# Patient Record
(demographics unavailable — no encounter records)

---

## 2024-10-15 NOTE — HISTORY OF PRESENT ILLNESS
[LMP unknown] : LMP unknown [FreeTextEntry1] : 37 y/o pt presents in office today for a well women exam  On Tamoxifen  Thyroid inflammation and oncologist felt nodule, had thyroid u/s - will see endocrinologist

## 2024-10-21 NOTE — PHYSICAL EXAM
[Restricted in physically strenuous activity but ambulatory and able to carry out work of a light or sedentary nature] : Status 1- Restricted in physically strenuous activity but ambulatory and able to carry out work of a light or sedentary nature, e.g., light house work, office work [Normal] : affect appropriate [de-identified] : mild goiter, no palpable mass [de-identified] : Tissue expanders in place, no palpable masses in the chest wall or axillae [de-identified] : tearful and anxious

## 2024-10-21 NOTE — HISTORY OF PRESENT ILLNESS
[de-identified] : Patient is a 35 year old woman with a history of Stage IIB [id number] right invasive ductal carcinoma ER 90%, UT 15%, HER-2 negative with metastatic disease to right axillary lymph node s/p neoadjuvant ddAC-T (6/9/23-11/8/23), 12/20/23 b/l mast/R SLNB, adjuvant RT 4240 cGy to right breast (completed 3/11/24), Verzenio 6/8/24-7/2024 (discontinued due to side effects), anastrozole 4/2024-9/25/24 (discontinued due to resumption of menses and body aches), on Lupron since 3/1/24 and tamoxifen since 9/26/24 is here for f/u.  Patient initially palpated the mass a couple weeks ago.  She presented to her primary care physician who ordered breast imaging.  Bilateral diagnostic mammogram and sonogram on 5/2/2023 showed a 1.4 x 1.6 cm mass in the right breast found on sonogram that corresponded to the area of palpable concern.  There was also an atypical right axillary lymph node measuring 1.1 x 1 cm corresponding to an area also seen on mammogram.   Ultrasound-guided core biopsy of both lesions on 5/5/2023 showed invasive ductal carcinoma, ER positive, UT positive, HER2 negative.  Of note, the mammogram did show about 7 to 8 cm of extensive coarse heterogeneous calcifications.. Patient denies skin changes/dimpling, no nipple discharge bilaterally.   Patient reports chronic low back pain more recently associated with some tingling in her left LEs. States that often has to lift heavy things working as a . Denies weight loss. Denies fever, night sweats, headache, blurry vision, shortness of breath, abdominal/pelvic pain. Endorses menorrhagia since 1 year ago, secondary to IUD.   Recently  from her partner. Her 7 year old alternates between the parents. Lives in [place].   5/19/23 MR BREAST Recommend surgical management of highly likely multifocal carcinoma within the right breast with associated axillary metastasis.  If breast sparing or neoadjuvant chemotherapy under consideration, biopsy is recommended of at least 3 regions, including dominant regions located superiorly and medially and of the adjacent two suspicious regions located centrally and more inferiorly to define the extent of disease; targeted ultrasound is recommended to guide biopsy.  Findings and recommendations were relayed directly to Dr. Montanez by Dr. Cota at time of dictation.  5/22/23 PET 1. Multifocal uptake in the upper right breast, SUV max 7.9, consistent with known malignancy. 2. A few prominent hypermetabolic right axillary nodes, consistent with metastatic disease. No other sites concerning for FDG avid metastatic disease are identified. 3. 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  10/30/23 MR BREAST B/L Near complete resolution of previously demonstrated carcinoma within the right breast with interval decrease in known metastatic right axillary lymph nodes.  5/30/24: bone mineral density is within the expected range of normal for a woman of this age.   10/1/24 US thyroid: Diffusely heterogeneous thyroid with ill-defined areas of hypoechogenicity. Findings are suggestive of thyroiditis. Recommend clinical and laboratory correlation. [de-identified] : Pt reported generalized muscle/body aches that is affecting her work and home life.  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  She also reported having her menstrual period.  She missed her July Lupron dose and had a period end of July and her LMP was mid-Sept.  She was switched to tamoxifen at last visit and tolerating well.  She is planning JOHANNE? reconstruction in January.  She previously reported anterior midline neck pain to palpation x several weeks at her last visit.  She was supposed to get a thyroid US done but she never did it.  She denies recent URI, throat/dental infection, thyroid issues, odynophagia, dysphagia.  10/1/24 US thyroid showed small nodule and findings suggestive of thyroiditis.  She was referred to endocrinology for further evaluation, but did not receive call.  GYN annual exam: 10/15/24

## 2024-10-21 NOTE — REVIEW OF SYSTEMS
MARTHA BRODY     correction at this time. A  complete description of the surgery, including where the incisions would be placed and exactly what procedures would be performed was then discussed, and the patient clearly understands what the plan would be. The patient was then advised as to the healing time for this procedure, and understands the restrictions regarding activity immediately post-operatively. When necessary, orthotics have been discussed and biomechanical examination has been performed.  The patient understands that surgery is not an exact science and that complications can and will occur even though we attempt to keep that occurrence to a minimum.  5. Complications and risks, such as, but not limited to: delayed healing, swelling, infection, scarring, recurrence of the deformity and bad results are all problems that can develop.  The patient also understands that there are NO GUARANTEES AS TO THE RESULTS WHATSOEVER. The patient understands they can call the office if they have any questions before or after the surgery.  6. At this time I recommended left fifth toe arthroplasty procedure  7. They will be weightbearing as tolerated on the left limb for 2-3 weeks in a surgical shoe.  8. Will obtain H&P prior to surgical intervention.       Planned Procedure:  Left 5th toe derotational arthroplasty  Anesthesia type:  MAC  CPT: 77250  Length of Case:  30 minutes  Diagnosis as above  Equipment:  Podiatry tray, sagittal saw, mini C-arm  Antibiotic coverage:  2 g Charlie Gallardo DPM     [Diarrhea: Grade 0] : Diarrhea: Grade 0 [Negative] : Allergic/Immunologic

## 2024-10-21 NOTE — HISTORY OF PRESENT ILLNESS
[de-identified] : Patient is a 35 year old woman with a history of Stage IIB [id number] right invasive ductal carcinoma ER 90%, TN 15%, HER-2 negative with metastatic disease to right axillary lymph node s/p neoadjuvant ddAC-T (6/9/23-11/8/23), 12/20/23 b/l mast/R SLNB, adjuvant RT 4240 cGy to right breast (completed 3/11/24), Verzenio 6/8/24-7/2024 (discontinued due to side effects), anastrozole 4/2024-9/25/24 (discontinued due to resumption of menses and body aches), on Lupron since 3/1/24 and tamoxifen since 9/26/24 is here for f/u.  Patient initially palpated the mass a couple weeks ago.  She presented to her primary care physician who ordered breast imaging.  Bilateral diagnostic mammogram and sonogram on 5/2/2023 showed a 1.4 x 1.6 cm mass in the right breast found on sonogram that corresponded to the area of palpable concern.  There was also an atypical right axillary lymph node measuring 1.1 x 1 cm corresponding to an area also seen on mammogram.   Ultrasound-guided core biopsy of both lesions on 5/5/2023 showed invasive ductal carcinoma, ER positive, TN positive, HER2 negative.  Of note, the mammogram did show about 7 to 8 cm of extensive coarse heterogeneous calcifications.. Patient denies skin changes/dimpling, no nipple discharge bilaterally.   Patient reports chronic low back pain more recently associated with some tingling in her left LEs. States that often has to lift heavy things working as a . Denies weight loss. Denies fever, night sweats, headache, blurry vision, shortness of breath, abdominal/pelvic pain. Endorses menorrhagia since 1 year ago, secondary to IUD.   Recently  from her partner. Her 7 year old alternates between the parents. Lives in [place].   5/19/23 MR BREAST Recommend surgical management of highly likely multifocal carcinoma within the right breast with associated axillary metastasis.  If breast sparing or neoadjuvant chemotherapy under consideration, biopsy is recommended of at least 3 regions, including dominant regions located superiorly and medially and of the adjacent two suspicious regions located centrally and more inferiorly to define the extent of disease; targeted ultrasound is recommended to guide biopsy.  Findings and recommendations were relayed directly to Dr. Montanez by Dr. Cota at time of dictation.  5/22/23 PET 1. Multifocal uptake in the upper right breast, SUV max 7.9, consistent with known malignancy. 2. A few prominent hypermetabolic right axillary nodes, consistent with metastatic disease. No other sites concerning for FDG avid metastatic disease are identified. 3. 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  10/30/23 MR BREAST B/L Near complete resolution of previously demonstrated carcinoma within the right breast with interval decrease in known metastatic right axillary lymph nodes.  5/30/24: bone mineral density is within the expected range of normal for a woman of this age.   10/1/24 US thyroid: Diffusely heterogeneous thyroid with ill-defined areas of hypoechogenicity. Findings are suggestive of thyroiditis. Recommend clinical and laboratory correlation. [de-identified] : Pt reported generalized muscle/body aches that is affecting her work and home life.  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  She also reported having her menstrual period.  She missed her July Lupron dose and had a period end of July and her LMP was mid-Sept.  She was switched to tamoxifen at last visit and tolerating well.  She is planning JOHANNE? reconstruction in January.  She previously reported anterior midline neck pain to palpation x several weeks at her last visit.  She was supposed to get a thyroid US done but she never did it.  She denies recent URI, throat/dental infection, thyroid issues, odynophagia, dysphagia.  10/1/24 US thyroid showed small nodule and findings suggestive of thyroiditis.  She was referred to endocrinology for further evaluation, but did not receive call.  GYN annual exam: 10/15/24

## 2024-10-21 NOTE — PHYSICAL EXAM
[Restricted in physically strenuous activity but ambulatory and able to carry out work of a light or sedentary nature] : Status 1- Restricted in physically strenuous activity but ambulatory and able to carry out work of a light or sedentary nature, e.g., light house work, office work [Normal] : affect appropriate [de-identified] : mild goiter, no palpable mass [de-identified] : Tissue expanders in place, no palpable masses in the chest wall or axillae [de-identified] : tearful and anxious

## 2024-10-21 NOTE — HISTORY OF PRESENT ILLNESS
[de-identified] : Patient is a 35 year old woman with a history of Stage IIB [id number] right invasive ductal carcinoma ER 90%, NV 15%, HER-2 negative with metastatic disease to right axillary lymph node s/p neoadjuvant ddAC-T (6/9/23-11/8/23), 12/20/23 b/l mast/R SLNB, adjuvant RT 4240 cGy to right breast (completed 3/11/24), Verzenio 6/8/24-7/2024 (discontinued due to side effects), anastrozole 4/2024-9/25/24 (discontinued due to resumption of menses and body aches), on Lupron since 3/1/24 and tamoxifen since 9/26/24 is here for f/u.  Patient initially palpated the mass a couple weeks ago.  She presented to her primary care physician who ordered breast imaging.  Bilateral diagnostic mammogram and sonogram on 5/2/2023 showed a 1.4 x 1.6 cm mass in the right breast found on sonogram that corresponded to the area of palpable concern.  There was also an atypical right axillary lymph node measuring 1.1 x 1 cm corresponding to an area also seen on mammogram.   Ultrasound-guided core biopsy of both lesions on 5/5/2023 showed invasive ductal carcinoma, ER positive, NV positive, HER2 negative.  Of note, the mammogram did show about 7 to 8 cm of extensive coarse heterogeneous calcifications.. Patient denies skin changes/dimpling, no nipple discharge bilaterally.   Patient reports chronic low back pain more recently associated with some tingling in her left LEs. States that often has to lift heavy things working as a . Denies weight loss. Denies fever, night sweats, headache, blurry vision, shortness of breath, abdominal/pelvic pain. Endorses menorrhagia since 1 year ago, secondary to IUD.   Recently  from her partner. Her 7 year old alternates between the parents. Lives in [place].   5/19/23 MR BREAST Recommend surgical management of highly likely multifocal carcinoma within the right breast with associated axillary metastasis.  If breast sparing or neoadjuvant chemotherapy under consideration, biopsy is recommended of at least 3 regions, including dominant regions located superiorly and medially and of the adjacent two suspicious regions located centrally and more inferiorly to define the extent of disease; targeted ultrasound is recommended to guide biopsy.  Findings and recommendations were relayed directly to Dr. Montanez by Dr. Cota at time of dictation.  5/22/23 PET 1. Multifocal uptake in the upper right breast, SUV max 7.9, consistent with known malignancy. 2. A few prominent hypermetabolic right axillary nodes, consistent with metastatic disease. No other sites concerning for FDG avid metastatic disease are identified. 3. 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  10/30/23 MR BREAST B/L Near complete resolution of previously demonstrated carcinoma within the right breast with interval decrease in known metastatic right axillary lymph nodes.  5/30/24: bone mineral density is within the expected range of normal for a woman of this age.   10/1/24 US thyroid: Diffusely heterogeneous thyroid with ill-defined areas of hypoechogenicity. Findings are suggestive of thyroiditis. Recommend clinical and laboratory correlation. [de-identified] : Pt reported generalized muscle/body aches that is affecting her work and home life.  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  She also reported having her menstrual period.  She missed her July Lupron dose and had a period end of July and her LMP was mid-Sept.  She was switched to tamoxifen at last visit and tolerating well.  She is planning JOHANNE? reconstruction in January.  She previously reported anterior midline neck pain to palpation x several weeks at her last visit.  She was supposed to get a thyroid US done but she never did it.  She denies recent URI, throat/dental infection, thyroid issues, odynophagia, dysphagia.  10/1/24 US thyroid showed small nodule and findings suggestive of thyroiditis.  She was referred to endocrinology for further evaluation, but did not receive call.  GYN annual exam: 10/15/24

## 2024-10-21 NOTE — PAST MEDICAL HISTORY
[Menstruating] : The patient is menstruating [Menarche Age ____] : age at menarche was [unfilled] [Definite ___ (Date)] : the last menstrual period was [unfilled] [Irregular Cycle Intervals] : are  irregular [Total Preg ___] : G[unfilled] [Live Births ___] : P[unfilled]  [Age At Live Birth ___] : Age at live birth: [unfilled] [History of Hormone Replacement Treatment] : has no history of hormone replacement treatment [FreeTextEntry1] : IUD done 1 year ago [FreeTextEntry6] : no [FreeTextEntry7] : yes IUD [FreeTextEntry8] : yes but not long

## 2024-10-21 NOTE — PHYSICAL EXAM
[Restricted in physically strenuous activity but ambulatory and able to carry out work of a light or sedentary nature] : Status 1- Restricted in physically strenuous activity but ambulatory and able to carry out work of a light or sedentary nature, e.g., light house work, office work [Normal] : affect appropriate [de-identified] : mild goiter, no palpable mass [de-identified] : Tissue expanders in place, no palpable masses in the chest wall or axillae [de-identified] : tearful and anxious

## 2024-10-21 NOTE — PHYSICAL EXAM
[Restricted in physically strenuous activity but ambulatory and able to carry out work of a light or sedentary nature] : Status 1- Restricted in physically strenuous activity but ambulatory and able to carry out work of a light or sedentary nature, e.g., light house work, office work [Normal] : affect appropriate [de-identified] : mild goiter, no palpable mass [de-identified] : Tissue expanders in place, no palpable masses in the chest wall or axillae [de-identified] : tearful and anxious

## 2024-10-21 NOTE — HISTORY OF PRESENT ILLNESS
[facility] Progress Note      Patient Name: Halie Guidry  : 1940  MRN: [id number]  Primary Care Physician:  Napoleon Mead MD  Date of admission: 2024    Subjective     Interval History:   Follow-up acute kidney injury on chronic kidney disease    Patient resting comfortably.  Edema decreasing.  No chest pain, shortness of breath, nausea or vomiting    Review of Systems:   As noted above    Objective     Vitals:   Temp:  [97.5 °F (36.4 °C)-98.4 °F (36.9 °C)] 98.2 °F (36.8 °C)  Heart Rate:  [64-68] 65  Resp:  [18] 18  BP: ()/(49-76) 119/54  Flow (L/min):  [2-3] 2    Intake/Output Summary (Last 24 hours) at 2024 0930  Last data filed at 2024 0905  Gross per 24 hour   Intake 510 ml   Output 1100 ml   Net -590 ml       Physical Exam:    General Appearance: alert, awake, chronically ill, no acute distress   Skin: warm and dry  HEENT: oral mucosa normal, nonicteric sclera  Neck: supple, no JVD  Lungs: CTA, unlabored breathing effort  Heart: RRR, normal S1 and S2  Abdomen: soft, nontender, nondistended  : no palpable bladder  Extremities: Trace edema with erythema and venous stasis changes  Neuro: normal speech and mental status     Scheduled Meds:     amLODIPine, 5 mg, Oral, Daily  ammonium lactate, 1 Application, Topical, BID  atorvastatin, 80 mg, Oral, Nightly  bisoprolol, 5 mg, Oral, Daily  castor oil-balsam peru, 1 Application, Topical, Q12H  DULoxetine, 30 mg, Oral, Daily  enoxaparin, 30 mg, Subcutaneous, Nightly  HYDROcodone-acetaminophen, 1 tablet, Oral, TID  hydrocortisone-bacitracin-zinc oxide-nystatin, 1 Application, Topical, BID  insulin glargine, 35 Units, Subcutaneous, Q12H  insulin lispro, 12 Units, Subcutaneous, TID With Meals  insulin lispro, 2-7 Units, Subcutaneous, 4x Daily AC & at Bedtime  levothyroxine, 112 mcg, Oral, Once per day on   levothyroxine, 168 mcg, Oral, Every  Sunday  pantoprazole, 40 mg, Oral, Q AM  pregabalin, 75 mg, Oral, BID  sodium chloride, 10 mL, Intravenous, Q12H  [Held by provider] torsemide, 20 mg, Oral, Daily  cyanocobalamin, 1,000 mcg, Oral, Daily      IV Meds:            Results Reviewed:   I have personally reviewed the results from the time of this admission to 4/27/2024 09:30 EDT     Results from last 7 days   Lab Units 04/27/24  0554 04/26/24  0533 04/25/24  0736 04/24/24  0614 04/23/24  0547   SODIUM mmol/L 136 137  137 136 136 134*   POTASSIUM mmol/L 3.7 3.7  3.7 3.7 3.4* 3.7   CHLORIDE mmol/L 96* 99  99 98 98 97*   CO2 mmol/L 24.2 23.4  23.4 23.7 25.0 23.0   BUN mg/dL 67* 61*  61* 53* 44* 37*   CREATININE mg/dL 2.47* 2.44*  2.44* 2.37* 2.04* 1.80*   CALCIUM mg/dL 8.8 8.8  8.8 9.3 8.3* 8.6   BILIRUBIN mg/dL  --   --  0.4 0.3 0.3   ALK PHOS U/L  --   --  104 90 97   ALT (SGPT) U/L  --   --  9 9 9   AST (SGOT) U/L  --   --  14 10 10   GLUCOSE mg/dL 142* 177*  177* 178* 279* 284*       Estimated Creatinine Clearance: 19.2 mL/min (A) (by C-G formula based on SCr of 2.47 mg/dL (H)).    Results from last 7 days   Lab Units 04/27/24  0554 04/26/24  0533 04/25/24  0736   MAGNESIUM mg/dL 2.0 2.1 2.3   PHOSPHORUS mg/dL 6.6* 5.4* 5.4*       Results from last 7 days   Lab Units 04/27/24  0554 04/26/24  0533 04/25/24  0736 04/24/24  0614 04/23/24  0547 04/22/24  0703   URIC ACID mg/dL 9.8* 9.4* 9.0* 9.5* 8.9* 8.4*       Results from last 7 days   Lab Units 04/27/24  0554 04/26/24  0533 04/25/24  0736 04/24/24  0614 04/23/24  0547   WBC 10*3/mm3 7.66 9.82 8.84 8.40 8.40   HEMOGLOBIN g/dL 11.5* 11.6* 12.6 11.1* 11.6*   PLATELETS 10*3/mm3 294 283 305 289 269             Assessment / Plan     ASSESSMENT:  Acute kidney injury associated with volume depletion and inability to autoregulate since the patient was taking ACE inhibitor renal function improving since admission creatinine slightly increased to 2.47, electrolyte within acceptable range   Chronic kidney  [de-identified] : Patient is a 35 year old woman with a history of Stage IIB [id number] right invasive ductal carcinoma ER 90%, VA 15%, HER-2 negative with metastatic disease to right axillary lymph node s/p neoadjuvant ddAC-T (6/9/23-11/8/23), 12/20/23 b/l mast/R SLNB, adjuvant RT 4240 cGy to right breast (completed 3/11/24), Verzenio 6/8/24-7/2024 (discontinued due to side effects), anastrozole 4/2024-9/25/24 (discontinued due to resumption of menses and body aches), on Lupron since 3/1/24 and tamoxifen since 9/26/24 is here for f/u.  Patient initially palpated the mass a couple weeks ago.  She presented to her primary care physician who ordered breast imaging.  Bilateral diagnostic mammogram and sonogram on 5/2/2023 showed a 1.4 x 1.6 cm mass in the right breast found on sonogram that corresponded to the area of palpable concern.  There was also an atypical right axillary lymph node measuring 1.1 x 1 cm corresponding to an area also seen on mammogram.   Ultrasound-guided core biopsy of both lesions on 5/5/2023 showed invasive ductal carcinoma, ER positive, VA positive, HER2 negative.  Of note, the mammogram did show about 7 to 8 cm of extensive coarse heterogeneous calcifications.. Patient denies skin changes/dimpling, no nipple discharge bilaterally.   Patient reports chronic low back pain more recently associated with some tingling in her left LEs. States that often has to lift heavy things working as a . Denies weight loss. Denies fever, night sweats, headache, blurry vision, shortness of breath, abdominal/pelvic pain. Endorses menorrhagia since 1 year ago, secondary to IUD.   Recently  from her partner. Her 7 year old alternates between the parents. Lives in [place].   5/19/23 MR BREAST Recommend surgical management of highly likely multifocal carcinoma within the right breast with associated axillary metastasis.  If breast sparing or neoadjuvant chemotherapy under consideration, biopsy is recommended of at least 3 regions, including dominant regions located superiorly and medially and of the adjacent two suspicious regions located centrally and more inferiorly to define the extent of disease; targeted ultrasound is recommended to guide biopsy.  Findings and recommendations were relayed directly to Dr. Montanez by Dr. Cota at time of dictation.  5/22/23 PET 1. Multifocal uptake in the upper right breast, SUV max 7.9, consistent with known malignancy. 2. A few prominent hypermetabolic right axillary nodes, consistent with metastatic disease. No other sites concerning for FDG avid metastatic disease are identified. 3. 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  10/30/23 MR BREAST B/L Near complete resolution of previously demonstrated carcinoma within the right breast with interval decrease in known metastatic right axillary lymph nodes.  5/30/24: bone mineral density is within the expected range of normal for a woman of this age.   10/1/24 US thyroid: Diffusely heterogeneous thyroid with ill-defined areas of hypoechogenicity. Findings are suggestive of thyroiditis. Recommend clinical and laboratory correlation. [de-identified] : Pt reported generalized muscle/body aches that is affecting her work and home life.  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  She also reported having her menstrual period.  She missed her July Lupron dose and had a period end of July and her LMP was mid-Sept.  She was switched to tamoxifen at last visit and tolerating well.  She is planning JOHANNE? reconstruction in January.  She previously reported anterior midline neck pain to palpation x several weeks at her last visit.  She was supposed to get a thyroid US done but she never did it.  She denies recent URI, throat/dental infection, thyroid issues, odynophagia, dysphagia.  10/1/24 US thyroid showed small nodule and findings suggestive of thyroiditis.  She was referred to endocrinology for further evaluation, but did not receive call.  GYN annual exam: 10/15/24 disease stage IIIb secondary to diabetic and hypertensive glomerulosclerosis, baseline creatinine is around 1.3  Hypertension associate with chronic kidney disease.  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    I reviewed the chart and other providers notes, reviewed labs.    Copied text in this note has been reviewed and is accurate as of 04/27/24.       Thank you for involving us in the care of Halie Guidry.  Please feel free to call with any questions.    Ishaan Saba MD  04/27/24  09:30 EDT    [facility]  [phone]    Please note that portions of this note were completed with a voice recognition program.

## 2024-10-30 NOTE — REVIEW OF SYSTEMS
[Fatigue] : no fatigue [Decreased Appetite] : appetite not decreased [Recent Weight Gain (___ Lbs)] : no recent weight gain [Visual Field Defect] : no visual field defect [Dry Eyes] : no dryness [Dysphagia] : no dysphagia [Neck Pain] : no neck pain [Dysphonia] : no dysphonia [Chest Pain] : no chest pain [Slow Heart Rate] : heart rate is not slow [Palpitations] : no palpitations [Shortness Of Breath] : no shortness of breath [Cough] : no cough [Nausea] : no nausea [Constipation] : no constipation [Vomiting] : no vomiting [Polyuria] : no polyuria [Dysuria] : no dysuria [Irregular Menses] : regular menses [Joint Pain] : no joint pain [Muscle Weakness] : no muscle weakness [Acanthosis] : no acanthosis  [Acne] : no acne [Headaches] : no headaches [Dizziness] : no dizziness [Tremors] : no tremors [Depression] : no depression [Polydipsia] : no polydipsia [Cold Intolerance] : no cold intolerance [Easy Bleeding] : no ~M tendency for easy bleeding [Easy Bruising] : no tendency for easy bruising

## 2024-10-30 NOTE — ASSESSMENT
[FreeTextEntry1] : 36 year old female with history of Stage IIB [id number] right invasive ductal carcinoma with metastases to the right axillary nodes currently on Lupron and Tamoxifen here for evaluation of anterior neck pain.  Patient started a month prior and now is significantly less compared to before. She denied any overt hyperthyroid symptoms.   Thyroiditis:  -Check TFTs, ESR, CRP, TSI -Advised to take NSAIDs as needed -Will be called back with results -US showing resolution of hypoechoic areas that occur during thyroiditis   Thyroid Nodule:  -Right sided 6 mm nodule  -Follow up US in 18 months   -Follow up as per results

## 2024-10-30 NOTE — PHYSICAL EXAM
[Alert] : alert [Well Nourished] : well nourished [No Acute Distress] : no acute distress [Normal Sclera/Conjunctiva] : normal sclera/conjunctiva [PERRL] : pupils equal, round and reactive to light [Normal Outer Ear/Nose] : the ears and nose were normal in appearance [Normal TMs] : both tympanic membranes were normal [No Respiratory Distress] : no respiratory distress [Clear to Auscultation] : lungs were clear to auscultation bilaterally [Normal S1, S2] : normal S1 and S2 [Normal Rate] : heart rate was normal [Normal Bowel Sounds] : normal bowel sounds [Soft] : abdomen soft [Normal Gait] : normal gait [No Clubbing, Cyanosis] : no clubbing  or cyanosis of the fingernails [No Joint Swelling] : no joint swelling seen [No Skin Lesions] : no skin lesions [Oriented x3] : oriented to person, place, and time [Normal Affect] : the affect was normal [Normal Insight/Judgement] : insight and judgment were intact [Thyroid Not Enlarged] : the thyroid was not enlarged [de-identified] : +denied any anterior thyroidal pain on palpation

## 2024-10-30 NOTE — HISTORY OF PRESENT ILLNESS
[FreeTextEntry1] : 36 year old woman with a history of Stage IIB [id number] right invasive ductal carcinoma ER 90%, NE 15%, HER-2 negative with metastatic disease to right axillary lymph node s/p neoadjuvant ddAC-T (6/9/23-11/8/23), 12/20/23 b/l mast/R SLNB, adjuvant RT 4240 cGy to right breast (completed 3/11/24), Verzenio 6/8/24-7/2024 (discontinued due to side effects), anastrozole 4/2024-9/25/24 (discontinued due to resumption of menses and body aches), on Lupron since 3/1/24 and tamoxifen since 9/26/24 is here for  evaluation of possible thyroiditis.   She previously reported anterior midline neck pain to palpation x several weeks in the month of September and october 2024. She reported that it has improved this then.  She denied any heart palpitations, tremors, hair loss, weight changes or changes in her bowel movements.    10/1/24 US thyroid: Diffusely heterogeneous thyroid with ill-defined areas of hypo-echogenicity. 6 mm right sided thyroid nodule   Findings are suggestive of thyroiditis. Recommend clinical and laboratory correlation.

## 2025-04-17 NOTE — HISTORY OF PRESENT ILLNESS
[FreeTextEntry1] : Helen Melton is a 35 yo F with RIGHT breast AJCC 8th Ed Stage IIB (cT2N1; ypTisN0(sn)) ER 90%/PR15%/HER-2 negative IDC s/p AC, Taxol, now s/p b/l mastectomies and right SLNB with TE reconstruction 12/20/23 (no residual invasive disease, high-grade DCIS+, 0/1 LN) referred to radiation oncology by Dr. Montanez for discussion of adjuvant radiation to the Right breast. She completed 4240cGy to the Right breast on 3/11/24.  JOHANNE flap reconstruction with Dr. Wylie 2/2025.   4/29/25: RPA She continues on Lupron and tamoxifen under the care of Dr. Mathew. Seen by Dr. Montanez on 4/5/25.   4/23/24: PTE Ms. Melton presents for post treatment evaluation. She reports she has regained her energy, and she has been participating in [employer] runs. Appetite is good. Skin is healing and hyperpigmentation is resolving.   3/5/24:  Final OTV  3180cGy/4240cGy, 12/16fx to Right breast. Ms. King reports feeling well. She has mild fatigue, unchanged from previous. Appetite is good. She has mild redness on breast. She is using mometasone as instructed. Discharge packet reviewed with patient. Plan to complete radiation and follow up in April.   2/28/24: OTV 2120cGy/4240cGy, 8/16fx to Right breast. She reports she is feeling well overall, She is currently recovering from a cold. Appetite is good. Skin is good, mild redness. She is using mometasone and OTC as directed. ROM is good. Plan to continue radiation.   2/21/24: OTV 1060cGy/4240cGy, 4/16fx to Right breast. She reports feeling well overall. Has some lightheadedness, and states she is not adequately hydrating. Advised to hydrate. Appetite is good. Skin is good, she is not yet using OTC. Script for mometasone sent to pharmacy and patient instructed in use. Plan to continue radiation.   1/16/24: SNA Patient presents postoperatively for re evaluation for adjuvant radiation. She reports her energy level is improving, and her appetite is good. Surgical incisions are healing, she continues to feel some pinching. Numbness remains Right axilla. She denies pain.   11/10/23: Consultation Patient is here for discussion of adjuvant radiation. She reports she is mildly fatigued as she had her last dose of Taxol on Wednesday. Otherwise she is generally feeling well. She denies pain. She is anxious regarding the next steps of treatment.   History of Present Illness   ________________________________   Helen Melton is a 35 year old woman with new diagnosis of right invasive ductal carcinoma ER 90%, MI 15%, HER-2 negative with metastatic disease to right axillary lymph node. She initially palpated the mass in April/May. She presented to her primary care physician who ordered breast imaging.    5/2/23: bilateral diagnostic mammogram and sonogram. 1.4 x 1.6 cm mass in the right breast found on sonogram that corresponded to the area of palpable concern. There was also an atypical right axillary lymph node measuring 1.1 x 1 cm corresponding to an area also seen on mammogram. Ultrasound-guided biopsy of both lesions was recommended.     5/5/23: Ultrasound-guided core biopsy of both lesions showed invasive ductal carcinoma, ER positive, MI positive, HER2 negative.   Of note, the mammogram did show about 7 to 8 cm of extensive coarse heterogeneous calcifications.. Patient denies skin changes/dimpling, no nipple discharge bilaterally.    5/19/23 ([facility]) MRI: Recommend surgical management of highly likely multifocal carcinoma within the right breast with associated axillary metastasis. If breast sparing or neoadjuvant chemotherapy under consideration, biopsy is recommended of at least 3 regions, including dominant regions located superiorly and medially and of the adjacent two suspicious regions located centrally and more inferiorly to define the extent of disease; targeted ultrasound is recommended to guide biopsy. ADDENDUM # 1 ***   There are multiple enlarged lymph nodes in the right axilla, including in the high retropectoral region, corresponding to known axillary metastasis.    5/22/23 ([facility]) PET/CT: 1. Multifocal uptake in the upper right breast, SUV max 7.9, consistent with known malignancy.   2. A few prominent hypermetabolic right axillary nodes, consistent with metastatic disease. No other sites concerning for FDG avid metastatic disease are identified.   3. 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    6/9/23-11/8/23: Adjuvant chemotherapy  AC w/ Neulasta (6/9/23-7/21/23)  Taxol (8/4/23- 11/8/23)   10/30/23 ([facility]) MRI: Near complete resolution of previously demonstrated carcinoma within the right breast with interval decrease in known metastatic right axillary lymph nodes. RECOMMENDATION: Surgical or oncologic management. BI-RADS 6.   12/20/23: surgery/pathology  Bilateral mastectomy, right sentinel lymph node biopsy, injection of Magtrace, and localization of right Magseed lymph node  Final Diagnosis  1. Breast, left; mastectomy:  - Benign breast parenchyma  - Benign nipple and skin  2. 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 Right axillary sentinel lymph node:  - One benign lymph node (0/1) with treatment effect and biopsy site changes (See note)  Note: Immunohistochemical stain for cytokeratin AE1/AE3 supports the diagnosis. Part 3 was reviewed in consultation by Dr. Sheri Dillard who concurs.  4. Breast, right, additional anterior; excision:  - Benign breast parenchyma (See note)   Note: Multiple levels examined Part 4 was reviewed in consultation by Dr. Sheri Dillard who concurs.  5. Breast, right, additional lateral; excision: Benign fibroadipose tissue  6. Right axillary tail: Benign fibroadipose tissue  7. Left axillary tail: Benign fibroadipose tissue   12/20/23:  TE reconstruction with Dr Wylie

## 2025-04-17 NOTE — DISEASE MANAGEMENT
[Clinical] : TNM Stage: c [IIB] : IIB [TTNM] : 2 [NTNM] : 1 [MTNM] : X [de-identified] : [id number] [de-identified] : [id number] [de-identified] : Right breast

## 2025-04-28 NOTE — ASSESSMENT
[FreeTextEntry1] : Today we discussed the **POSITIVE trial (Pregnancy Outcome and Safety of Interrupting Therapy for women with endocrine responsive breast cancer)**, also known as IBCSG 48-14 / BIG 8-13.  Here's a summary of what it investigated and found:  1.  **Purpose:** The trial was designed to assess the safety of temporarily interrupting adjuvant endocrine therapy (like Tamoxifen or Aromatase Inhibitors +/- ovarian suppression) for up to 2 years in young women with early-stage, hormone receptor-positive (HR+) breast cancer who wished to attempt pregnancy. 2.  **Participants:** It enrolled premenopausal women (aged 42 or younger) diagnosed with Stage I, II, or III HR+ breast cancer who had completed between 18 and 30 months of adjuvant endocrine therapy and wanted to pause treatment to conceive. 3.  **Key Question:** Does interrupting endocrine therapy to attempt pregnancy increase the risk of breast cancer recurrence compared to continuing therapy without interruption? 4.  **Primary Findings (presented initially at [facility] 2022 and published in the NEJM in 2023):**     *   **Safety (Short-term Recurrence):** At a median follow-up of around 41 months, interrupting endocrine therapy for pregnancy was found to be **safe in the short term**. The rate of breast cancer recurrence among women in the trial was **not significantly higher** than that observed in external control cohorts (like the SOFT/TEXT trials) of similar patients who continued endocrine therapy without interruption. The 3-year invasive breast cancer-free survival rate was very similar.     *   **Pregnancy Outcomes:** A high percentage of women who stopped therapy and attempted pregnancy were able to conceive, and the rates of live births were comparable to those in the general population of similar age. The majority of babies born were healthy, with congenital anomaly rates consistent with the general population.     *   **Resumption of Therapy:** A large majority of women resumed their endocrine therapy after the pregnancy attempt window (successful or not).  **In essence, the POSITIVE trial provides reassuring short-term data suggesting that a temporary break (up to 2 years) in endocrine therapy for conception and pregnancy does not appear to significantly increase the risk of early recurrence for young women with HR+ early-stage breast cancer.**  It's important to note:  *   Longer-term follow-up is still ongoing and crucial to confirm these findings over time. *   The decision to interrupt therapy is highly personal and should be made after a thorough discussion between the patient and the oncology team, weighing the individual risks and benefits based on their specific cancer characteristics (stage, grade, janell status, etc.) and personal desire for pregnancy.

## 2025-04-28 NOTE — REASON FOR VISIT
Telephone Encounter by Ruben Davis RN, BSN at 08/13/18 08:35 AM     Author:  Ruben Davis RN, BSN Service:  (none) Author Type:  Registered Nurse     Filed:  08/13/18 08:36 AM Encounter Date:  8/11/2018 Status:  Signed     :  Ruben Davis RN, BSN (Registered Nurse)            Refilled per medication protocol. (last visit[NA1.1T] 2/27/18[NA1.1M], next visit[NA1.1T] 8/28/18[NA1.1M], last labs[NA1.1T] 3/13/18[NA1.1M])[NA1.1T]      Revision History        User Key Date/Time User Provider Type Action    > NA1.1 08/13/18 08:36 AM Ruben Davis RN, BSN Registered Nurse Sign    M - Manual, T - Template [Follow-Up Visit] : a follow-up [FreeTextEntry2] : breast cancer

## 2025-04-28 NOTE — PHYSICAL EXAM
[Restricted in physically strenuous activity but ambulatory and able to carry out work of a light or sedentary nature] : Status 1- Restricted in physically strenuous activity but ambulatory and able to carry out work of a light or sedentary nature, e.g., light house work, office work [Normal] : affect appropriate [de-identified] : mild goiter, no palpable mass [de-identified] : Tissue expanders in place, no palpable masses in the chest wall or axillae [de-identified] : tearful and anxious

## 2025-04-28 NOTE — HISTORY OF PRESENT ILLNESS
[de-identified] : Patient is a 36 year old woman with a history of Stage IIB [id number] right invasive ductal carcinoma ER 90%, CA 15%, HER-2 negative with metastatic disease to right axillary lymph node s/p neoadjuvant ddAC-T (6/9/23-11/8/23), 12/20/23 b/l mast/R SLNB, adjuvant RT 4240 cGy to right breast (completed 3/11/24), Verzenio 6/8/24-7/2024 (discontinued due to side effects), anastrozole 4/2024-9/25/24 (discontinued due to resumption of menses and body aches), on Lupron since 3/1/24 and tamoxifen since 9/26/24 is here for f/u.  She had a JOHANNE reconstruction approximately 2/2025.  Patient initially palpated the mass a couple weeks ago.  She presented to her primary care physician who ordered breast imaging.  Bilateral diagnostic mammogram and sonogram on 5/2/2023 showed a 1.4 x 1.6 cm mass in the right breast found on sonogram that corresponded to the area of palpable concern.  There was also an atypical right axillary lymph node measuring 1.1 x 1 cm corresponding to an area also seen on mammogram.   Ultrasound-guided core biopsy of both lesions on 5/5/2023 showed invasive ductal carcinoma, ER positive, CA positive, HER2 negative.  Of note, the mammogram did show about 7 to 8 cm of extensive coarse heterogeneous calcifications.. Patient denies skin changes/dimpling, no nipple discharge bilaterally.   Patient reports chronic low back pain more recently associated with some tingling in her left LEs. States that often has to lift heavy things working as a . Denies weight loss. Denies fever, night sweats, headache, blurry vision, shortness of breath, abdominal/pelvic pain. Endorses menorrhagia since 1 year ago, secondary to IUD.   Recently  from her partner. Her 7 year old alternates between the parents. Lives in [place].   5/19/23 MR BREAST Recommend surgical management of highly likely multifocal carcinoma within the right breast with associated axillary metastasis.  If breast sparing or neoadjuvant chemotherapy under consideration, biopsy is recommended of at least 3 regions, including dominant regions located superiorly and medially and of the adjacent two suspicious regions located centrally and more inferiorly to define the extent of disease; targeted ultrasound is recommended to guide biopsy.  Findings and recommendations were relayed directly to Dr. Montanez by Dr. Cota at time of dictation.  5/22/23 PET 1. Multifocal uptake in the upper right breast, SUV max 7.9, consistent with known malignancy. 2. A few prominent hypermetabolic right axillary nodes, consistent with metastatic disease. No other sites concerning for FDG avid metastatic disease are identified. 3. 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  10/30/23 MR BREAST B/L Near complete resolution of previously demonstrated carcinoma within the right breast with interval decrease in known metastatic right axillary lymph nodes.  5/30/24: bone mineral density is within the expected range of normal for a woman of this age.   10/1/24 US thyroid: Diffusely heterogeneous thyroid with ill-defined areas of hypoechogenicity. Findings are suggestive of thyroiditis. Recommend clinical and laboratory correlation. [de-identified] : She is tolerating well.  She is inquiring about possibility of having a baby.

## 2025-04-28 NOTE — PHYSICAL EXAM
[Restricted in physically strenuous activity but ambulatory and able to carry out work of a light or sedentary nature] : Status 1- Restricted in physically strenuous activity but ambulatory and able to carry out work of a light or sedentary nature, e.g., light house work, office work [Normal] : affect appropriate [de-identified] : mild goiter, no palpable mass [de-identified] : Tissue expanders in place, no palpable masses in the chest wall or axillae [de-identified] : tearful and anxious

## 2025-04-28 NOTE — HISTORY OF PRESENT ILLNESS
[de-identified] : Patient is a 36 year old woman with a history of Stage IIB [id number] right invasive ductal carcinoma ER 90%, KS 15%, HER-2 negative with metastatic disease to right axillary lymph node s/p neoadjuvant ddAC-T (6/9/23-11/8/23), 12/20/23 b/l mast/R SLNB, adjuvant RT 4240 cGy to right breast (completed 3/11/24), Verzenio 6/8/24-7/2024 (discontinued due to side effects), anastrozole 4/2024-9/25/24 (discontinued due to resumption of menses and body aches), on Lupron since 3/1/24 and tamoxifen since 9/26/24 is here for f/u.  She had a JOHANNE reconstruction approximately 2/2025.  Patient initially palpated the mass a couple weeks ago.  She presented to her primary care physician who ordered breast imaging.  Bilateral diagnostic mammogram and sonogram on 5/2/2023 showed a 1.4 x 1.6 cm mass in the right breast found on sonogram that corresponded to the area of palpable concern.  There was also an atypical right axillary lymph node measuring 1.1 x 1 cm corresponding to an area also seen on mammogram.   Ultrasound-guided core biopsy of both lesions on 5/5/2023 showed invasive ductal carcinoma, ER positive, KS positive, HER2 negative.  Of note, the mammogram did show about 7 to 8 cm of extensive coarse heterogeneous calcifications.. Patient denies skin changes/dimpling, no nipple discharge bilaterally.   Patient reports chronic low back pain more recently associated with some tingling in her left LEs. States that often has to lift heavy things working as a . Denies weight loss. Denies fever, night sweats, headache, blurry vision, shortness of breath, abdominal/pelvic pain. Endorses menorrhagia since 1 year ago, secondary to IUD.   Recently  from her partner. Her 7 year old alternates between the parents. Lives in [place].   5/19/23 MR BREAST Recommend surgical management of highly likely multifocal carcinoma within the right breast with associated axillary metastasis.  If breast sparing or neoadjuvant chemotherapy under consideration, biopsy is recommended of at least 3 regions, including dominant regions located superiorly and medially and of the adjacent two suspicious regions located centrally and more inferiorly to define the extent of disease; targeted ultrasound is recommended to guide biopsy.  Findings and recommendations were relayed directly to Dr. Montanez by Dr. Cota at time of dictation.  5/22/23 PET 1. Multifocal uptake in the upper right breast, SUV max 7.9, consistent with known malignancy. 2. A few prominent hypermetabolic right axillary nodes, consistent with metastatic disease. No other sites concerning for FDG avid metastatic disease are identified. 3. 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  10/30/23 MR BREAST B/L Near complete resolution of previously demonstrated carcinoma within the right breast with interval decrease in known metastatic right axillary lymph nodes.  5/30/24: bone mineral density is within the expected range of normal for a woman of this age.   10/1/24 US thyroid: Diffusely heterogeneous thyroid with ill-defined areas of hypoechogenicity. Findings are suggestive of thyroiditis. Recommend clinical and laboratory correlation. [de-identified] : She is tolerating well.  She is inquiring about possibility of having a baby.

## 2025-04-28 NOTE — HISTORY OF PRESENT ILLNESS
[de-identified] : Patient is a 36 year old woman with a history of Stage IIB [id number] right invasive ductal carcinoma ER 90%, MD 15%, HER-2 negative with metastatic disease to right axillary lymph node s/p neoadjuvant ddAC-T (6/9/23-11/8/23), 12/20/23 b/l mast/R SLNB, adjuvant RT 4240 cGy to right breast (completed 3/11/24), Verzenio 6/8/24-7/2024 (discontinued due to side effects), anastrozole 4/2024-9/25/24 (discontinued due to resumption of menses and body aches), on Lupron since 3/1/24 and tamoxifen since 9/26/24 is here for f/u.  She had a JOHANNE reconstruction approximately 2/2025.  Patient initially palpated the mass a couple weeks ago.  She presented to her primary care physician who ordered breast imaging.  Bilateral diagnostic mammogram and sonogram on 5/2/2023 showed a 1.4 x 1.6 cm mass in the right breast found on sonogram that corresponded to the area of palpable concern.  There was also an atypical right axillary lymph node measuring 1.1 x 1 cm corresponding to an area also seen on mammogram.   Ultrasound-guided core biopsy of both lesions on 5/5/2023 showed invasive ductal carcinoma, ER positive, MD positive, HER2 negative.  Of note, the mammogram did show about 7 to 8 cm of extensive coarse heterogeneous calcifications.. Patient denies skin changes/dimpling, no nipple discharge bilaterally.   Patient reports chronic low back pain more recently associated with some tingling in her left LEs. States that often has to lift heavy things working as a . Denies weight loss. Denies fever, night sweats, headache, blurry vision, shortness of breath, abdominal/pelvic pain. Endorses menorrhagia since 1 year ago, secondary to IUD.   Recently  from her partner. Her 7 year old alternates between the parents. Lives in [place].   5/19/23 MR BREAST Recommend surgical management of highly likely multifocal carcinoma within the right breast with associated axillary metastasis.  If breast sparing or neoadjuvant chemotherapy under consideration, biopsy is recommended of at least 3 regions, including dominant regions located superiorly and medially and of the adjacent two suspicious regions located centrally and more inferiorly to define the extent of disease; targeted ultrasound is recommended to guide biopsy.  Findings and recommendations were relayed directly to Dr. Montanez by Dr. Cota at time of dictation.  5/22/23 PET 1. Multifocal uptake in the upper right breast, SUV max 7.9, consistent with known malignancy. 2. A few prominent hypermetabolic right axillary nodes, consistent with metastatic disease. No other sites concerning for FDG avid metastatic disease are identified. 3. 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  10/30/23 MR BREAST B/L Near complete resolution of previously demonstrated carcinoma within the right breast with interval decrease in known metastatic right axillary lymph nodes.  5/30/24: bone mineral density is within the expected range of normal for a woman of this age.   10/1/24 US thyroid: Diffusely heterogeneous thyroid with ill-defined areas of hypoechogenicity. Findings are suggestive of thyroiditis. Recommend clinical and laboratory correlation. [de-identified] : She is tolerating well.  She is inquiring about possibility of having a baby.

## 2025-04-28 NOTE — PHYSICAL EXAM
[Restricted in physically strenuous activity but ambulatory and able to carry out work of a light or sedentary nature] : Status 1- Restricted in physically strenuous activity but ambulatory and able to carry out work of a light or sedentary nature, e.g., light house work, office work [Normal] : affect appropriate [de-identified] : mild goiter, no palpable mass [de-identified] : Tissue expanders in place, no palpable masses in the chest wall or axillae [de-identified] : tearful and anxious

## 2025-05-02 NOTE — REVIEW OF SYSTEMS
[Negative] : Allergic/Immunologic [Edema Limbs: Grade 0] : Edema Limbs: Grade 0  [Fatigue: Grade 0] : Fatigue: Grade 0 [Localized Edema: Grade 0] : Localized Edema: Grade 0

## 2025-05-02 NOTE — HISTORY OF PRESENT ILLNESS
[FreeTextEntry1] : Helen Melton is a 37 yo F with RIGHT breast AJCC 8th Ed Stage IIB (cT2N1; ypTisN0(sn)) ER 90%/PR15%/HER-2 negative IDC s/p AC, Taxol, now s/p b/l mastectomies and right SLNB with TE reconstruction 12/20/23 (no residual invasive disease, high-grade DCIS+, 0/1 LN) referred to radiation oncology by Dr. Montanez for discussion of adjuvant radiation to the Right breast. She completed 4240cGy to the Right breast on 3/11/24.  JOHANNE flap reconstruction with Dr. Wylie 2/2025.   4/29/25: RPA Ms. Melton reports feeling well. Appetite is good. She denies fatigue. She has returned to work following JOHANNE flap reconstruction ROM is good. She continues on Lupron and tamoxifen under the care of Dr. Mathew. She was seen by Dr. Montanez on 4/5/25.   4/23/24: PTE Ms. Melton presents for post treatment evaluation. She reports she has regained her energy, and she has been participating in [employer] runs. Appetite is good. Skin is healing and hyperpigmentation is resolving.   3/5/24:  Final OTV  3180cGy/4240cGy, 12/16fx to Right breast. Ms. King reports feeling well. She has mild fatigue, unchanged from previous. Appetite is good. She has mild redness on breast. She is using mometasone as instructed. Discharge packet reviewed with patient. Plan to complete radiation and follow up in April.   2/28/24: OTV 2120cGy/4240cGy, 8/16fx to Right breast. She reports she is feeling well overall, She is currently recovering from a cold. Appetite is good. Skin is good, mild redness. She is using mometasone and OTC as directed. ROM is good. Plan to continue radiation.   2/21/24: OTV 1060cGy/4240cGy, 4/16fx to Right breast. She reports feeling well overall. Has some lightheadedness, and states she is not adequately hydrating. Advised to hydrate. Appetite is good. Skin is good, she is not yet using OTC. Script for mometasone sent to pharmacy and patient instructed in use. Plan to continue radiation.   1/16/24: SNA Patient presents postoperatively for re evaluation for adjuvant radiation. She reports her energy level is improving, and her appetite is good. Surgical incisions are healing, she continues to feel some pinching. Numbness remains Right axilla. She denies pain.   11/10/23: Consultation Patient is here for discussion of adjuvant radiation. She reports she is mildly fatigued as she had her last dose of Taxol on Wednesday. Otherwise she is generally feeling well. She denies pain. She is anxious regarding the next steps of treatment.   History of Present Illness   ________________________________   Helen Melton is a 35 year old woman with new diagnosis of right invasive ductal carcinoma ER 90%, UT 15%, HER-2 negative with metastatic disease to right axillary lymph node. She initially palpated the mass in April/May. She presented to her primary care physician who ordered breast imaging.    5/2/23: bilateral diagnostic mammogram and sonogram. 1.4 x 1.6 cm mass in the right breast found on sonogram that corresponded to the area of palpable concern. There was also an atypical right axillary lymph node measuring 1.1 x 1 cm corresponding to an area also seen on mammogram. Ultrasound-guided biopsy of both lesions was recommended.     5/5/23: Ultrasound-guided core biopsy of both lesions showed invasive ductal carcinoma, ER positive, UT positive, HER2 negative.   Of note, the mammogram did show about 7 to 8 cm of extensive coarse heterogeneous calcifications.. Patient denies skin changes/dimpling, no nipple discharge bilaterally.    5/19/23 ([facility]) MRI: Recommend surgical management of highly likely multifocal carcinoma within the right breast with associated axillary metastasis. If breast sparing or neoadjuvant chemotherapy under consideration, biopsy is recommended of at least 3 regions, including dominant regions located superiorly and medially and of the adjacent two suspicious regions located centrally and more inferiorly to define the extent of disease; targeted ultrasound is recommended to guide biopsy. ADDENDUM # 1 ***   There are multiple enlarged lymph nodes in the right axilla, including in the high retropectoral region, corresponding to known axillary metastasis.    5/22/23 ([facility]) PET/CT: 1. Multifocal uptake in the upper right breast, SUV max 7.9, consistent with known malignancy.   2. A few prominent hypermetabolic right axillary nodes, consistent with metastatic disease. No other sites concerning for FDG avid metastatic disease are identified.   3. 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    6/9/23-11/8/23: Adjuvant chemotherapy  AC w/ Neulasta (6/9/23-7/21/23)  Taxol (8/4/23- 11/8/23)   10/30/23 ([facility]) MRI: Near complete resolution of previously demonstrated carcinoma within the right breast with interval decrease in known metastatic right axillary lymph nodes. RECOMMENDATION: Surgical or oncologic management. BI-RADS 6.   12/20/23: surgery/pathology  Bilateral mastectomy, right sentinel lymph node biopsy, injection of Magtrace, and localization of right Magseed lymph node  Final Diagnosis  1. Breast, left; mastectomy:  - Benign breast parenchyma  - Benign nipple and skin  2. 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 Right axillary sentinel lymph node:  - One benign lymph node (0/1) with treatment effect and biopsy site changes (See note)  Note: Immunohistochemical stain for cytokeratin AE1/AE3 supports the diagnosis. Part 3 was reviewed in consultation by Dr. Sheri Dillard who concurs.  4. Breast, right, additional anterior; excision:  - Benign breast parenchyma (See note)   Note: Multiple levels examined Part 4 was reviewed in consultation by Dr. Sheri Dillard who concurs.  5. Breast, right, additional lateral; excision: Benign fibroadipose tissue  6. Right axillary tail: Benign fibroadipose tissue  7. Left axillary tail: Benign fibroadipose tissue   12/20/23:  TE reconstruction with Dr Wylie

## 2025-05-02 NOTE — HISTORY OF PRESENT ILLNESS
[FreeTextEntry1] : Helen Melton is a 37 yo F with RIGHT breast AJCC 8th Ed Stage IIB (cT2N1; ypTisN0(sn)) ER 90%/PR15%/HER-2 negative IDC s/p AC, Taxol, now s/p b/l mastectomies and right SLNB with TE reconstruction 12/20/23 (no residual invasive disease, high-grade DCIS+, 0/1 LN) referred to radiation oncology by Dr. Montanez for discussion of adjuvant radiation to the Right breast. She completed 4240cGy to the Right breast on 3/11/24.  JOHANNE flap reconstruction with Dr. Wylie 2/2025.   4/29/25: RPA Ms. Melton reports feeling well. Appetite is good. She denies fatigue. She has returned to work following JOHANNE flap reconstruction ROM is good. She continues on Lupron and tamoxifen under the care of Dr. Mathew. She was seen by Dr. Montanez on 4/5/25.   4/23/24: PTE Ms. Melton presents for post treatment evaluation. She reports she has regained her energy, and she has been participating in [employer] runs. Appetite is good. Skin is healing and hyperpigmentation is resolving.   3/5/24:  Final OTV  3180cGy/4240cGy, 12/16fx to Right breast. Ms. King reports feeling well. She has mild fatigue, unchanged from previous. Appetite is good. She has mild redness on breast. She is using mometasone as instructed. Discharge packet reviewed with patient. Plan to complete radiation and follow up in April.   2/28/24: OTV 2120cGy/4240cGy, 8/16fx to Right breast. She reports she is feeling well overall, She is currently recovering from a cold. Appetite is good. Skin is good, mild redness. She is using mometasone and OTC as directed. ROM is good. Plan to continue radiation.   2/21/24: OTV 1060cGy/4240cGy, 4/16fx to Right breast. She reports feeling well overall. Has some lightheadedness, and states she is not adequately hydrating. Advised to hydrate. Appetite is good. Skin is good, she is not yet using OTC. Script for mometasone sent to pharmacy and patient instructed in use. Plan to continue radiation.   1/16/24: SNA Patient presents postoperatively for re evaluation for adjuvant radiation. She reports her energy level is improving, and her appetite is good. Surgical incisions are healing, she continues to feel some pinching. Numbness remains Right axilla. She denies pain.   11/10/23: Consultation Patient is here for discussion of adjuvant radiation. She reports she is mildly fatigued as she had her last dose of Taxol on Wednesday. Otherwise she is generally feeling well. She denies pain. She is anxious regarding the next steps of treatment.   History of Present Illness   ________________________________   Helen Melton is a 35 year old woman with new diagnosis of right invasive ductal carcinoma ER 90%, [place] 15%, HER-2 negative with metastatic disease to right axillary lymph node. She initially palpated the mass in April/May. She presented to her primary care physician who ordered breast imaging.    5/2/23: bilateral diagnostic mammogram and sonogram. 1.4 x 1.6 cm mass in the right breast found on sonogram that corresponded to the area of palpable concern. There was also an atypical right axillary lymph node measuring 1.1 x 1 cm corresponding to an area also seen on mammogram. Ultrasound-guided biopsy of both lesions was recommended.     5/5/23: Ultrasound-guided core biopsy of both lesions showed invasive ductal carcinoma, ER positive, VA positive, HER2 negative.   Of note, the mammogram did show about 7 to 8 cm of extensive coarse heterogeneous calcifications.. Patient denies skin changes/dimpling, no nipple discharge bilaterally.    5/19/23 ([facility] MRI: Recommend surgical management of highly likely multifocal carcinoma within the right breast with associated axillary metastasis. If breast sparing or neoadjuvant chemotherapy under consideration, biopsy is recommended of at least 3 regions, including dominant regions located superiorly and medially and of the adjacent two suspicious regions located centrally and more inferiorly to define the extent of disease; targeted ultrasound is recommended to guide biopsy. ADDENDUM # 1 ***   There are multiple enlarged lymph nodes in the right axilla, including in the high retropectoral region, corresponding to known axillary metastasis.    5/22/23 ([facility]) PET/CT: 1. Multifocal uptake in the upper right breast, SUV max 7.9, consistent with known malignancy.   2. A few prominent hypermetabolic right axillary nodes, consistent with metastatic disease. No other sites concerning for FDG avid metastatic disease are identified.   3. 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    6/9/23-11/8/23: Adjuvant chemotherapy  AC w/ Neulasta (6/9/23-7/21/23)  Taxol (8/4/23- 11/8/23)   10/30/23 ([facility]) MRI: Near complete resolution of previously demonstrated carcinoma within the right breast with interval decrease in known metastatic right axillary lymph nodes. RECOMMENDATION: Surgical or oncologic management. BI-RADS 6.   12/20/23: surgery/pathology  Bilateral mastectomy, right sentinel lymph node biopsy, injection of Magtrace, and localization of right Magseed lymph node  Final Diagnosis  1. Breast, left; mastectomy:  - Benign breast parenchyma  - Benign nipple and skin  2. 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 Right axillary sentinel lymph node:  - One benign lymph node (0/1) with treatment effect and biopsy site changes (See note)  Note: Immunohistochemical stain for cytokeratin AE1/AE3 supports the diagnosis. Part 3 was reviewed in consultation by Dr. Sheri Dillard who concurs.  4. Breast, right, additional anterior; excision:  - Benign breast parenchyma (See note)   Note: Multiple levels examined Part 4 was reviewed in consultation by Dr. Sheri Dillard who concurs.  5. Breast, right, additional lateral; excision: Benign fibroadipose tissue  6. Right axillary tail: Benign fibroadipose tissue  7. Left axillary tail: Benign fibroadipose tissue   12/20/23:  TE reconstruction with Dr Wylie

## 2025-05-02 NOTE — VITALS
[Maximal Pain Intensity: 0/10] : 0/10 [100: Normal, no complaints, no evidence of disease.] : 100: Normal, no complaints, no evidence of disease. [ECOG Performance Status: 0 - Fully active, able to carry on all pre-disease performance without restriction] : Performance Status: 0 - Fully active, able to carry on all pre-disease performance without restriction

## 2025-05-02 NOTE — DISEASE MANAGEMENT
[TTNM] : 2 [NTNM] : 1 [MTNM] : X [de-identified] : [id number] [de-identified] : [id number] [de-identified] : Right breast

## 2025-05-02 NOTE — DISEASE MANAGEMENT
[TTNM] : 2 [NTNM] : 1 [MTNM] : X [de-identified] : [id number] [de-identified] : [id number] [de-identified] : Right breast